# Patient Record
Sex: FEMALE | Race: WHITE
[De-identification: names, ages, dates, MRNs, and addresses within clinical notes are randomized per-mention and may not be internally consistent; named-entity substitution may affect disease eponyms.]

---

## 2017-10-27 NOTE — US
INDICATION:  Pelvic pain. 



PELVIC ULTRASOUND, NON-OB LIMITED:  Multiple ultrasonic images were obtained 
with transabdominal probe.  The uterus is anteverted.  The urinary bladder is 
almost completely empty.  The uterus measured 8.2 x 3.4 x 4.8 cm, with an 
endometrial cavity echo of approximately 3 mm.  The ovaries were not seen.  No 
definite adnexal mass lesion or free fluid collection was seen.  



IMPRESSION:  No gross abnormalities identified.  Need endovaginal probe 
ultrasound for better visualization of the uterus and for visualization of 
ovaries hopefully.   





INDICATION:  Pelvic pain/unable to visualize right ovary on transabdominal 
pelvic ultrasound.  



TRANSVAGINAL NON-OB ULTRASOUND:  Utilizing transvaginal probe, multiple 
ultrasonic images revealed the uterus to measure 7.5 x 3.1 x 4.8 cm with a 2.4-
mm endometrial cavity. 



The right ovary measured 2.6 x 2.8 x 1.5 cm.



The left ovary measured 2.4 x 1.5 x 2 cm.  



The endometrial cavity echo appeared normal.  



What appear to be prominent arcuate vessels are noted in the myometrium.  



The myometrium is slightly heterogeneous, however, no definite masses are 
identified.  



The ovaries had a normal appearance, with normal appearing follicles for the 
most part.  However, on the right the follicles are somewhat numerous with an 
appearance suggesting a partial string-of-pearls which can be seen with 
polycystic ovarian disease.  These ovaries are not significantly enlarged, 
however, to strongly suggest that possibility - correlate clinically.



No adnexal mass lesions or free fluid collections were identified.



Normal ovarian blood flow was seen.



IMPRESSION:  Essentially normal pelvic ultrasound.  Follicles are somewhat 
numerous and have an appearance of a chain or string-of-pearls at the right 
ovary.  This should be correlated clinically.  It likely is an incidental 
finding, as the ovaries were not significantly enlarged.  Polycystic ovarian 
disease felt to be unlikely - correlate clinically.  

MTDD

## 2017-10-27 NOTE — EDM.PDOC
ED HPI GENERAL MEDICAL PROBLEM





- General


Chief Complaint: Abdominal Pain


Stated Complaint: STOMACH PAIN


Time Seen by Provider: 10/27/17 07:00


Source of Information: Reports: Patient, Family


History Limitations: Reports: No Limitations





- History of Present Illness


INITIAL COMMENTS - FREE TEXT/NARRATIVE: 





22 years old w f came to the ed at 6.15 due to severe pain off and on for 4 

weeks, getting worse. Pt was seen by my [facility] at 6 .15 am.  Pt was signed out 

to me due to shift changes at 7 am, pending labs and abd. CT. Pt received 

Zofran here in the ed. Nausea has improved 80%, periumbilical abdominal pain is 

still present. 


Onset: Unknown/Unsure


Onset Date: 09/27/17


Onset Time: 06:00


Duration: Week(s):, Getting Worse


Location: Reports: Abdomen


Quality: Reports: Ache, Burning, Dull


Severity: Moderate


Improves with: Reports: Rest


Worsens with: Reports: Movement


  ** Epigastric


Pain Score (Numeric/FACES): 5





- Related Data


 Allergies











Allergy/AdvReac Type Severity Reaction Status Date / Time


 


No Known Allergies Allergy   Verified 10/27/17 06:24











Home Meds: 


 Home Meds





Pantoprazole Sodium [Protonix] 40 mg PO DAILY #10 tab 10/27/17 [Rx]











Past Medical History





- Past Health History


Medical/Surgical History: Denies Medical/Surgical History


Other Gastrointestinal History: abd pain


Other OB/BYN History: pt notices more pain than normal with menstration





Social & Family History





- Tobacco Use


Smoking Status *Q: Current Every Day Smoker


Years of Tobacco use: 3


Packs/Tins Daily: 0.5





- Caffeine Use


Caffeine Use: Reports: Soda





- Recreational Drug Use


Recreational Drug Use: No





ED ROS GENERAL





- Review of Systems


Review Of Systems: See Below


Constitutional: Reports: No Symptoms


HEENT: Reports: No Symptoms


Respiratory: Reports: No Symptoms


Cardiovascular: Reports: No Symptoms


Endocrine: Reports: No Symptoms


GI/Abdominal: Reports: Abdominal Pain


: Reports: No Symptoms


Musculoskeletal: Reports: No Symptoms


Skin: Reports: No Symptoms


Neurological: Reports: No Symptoms


Psychiatric: Reports: No Symptoms


Hematologic/Lymphatic: Reports: No Symptoms


Immunologic: Reports: No Symptoms





ED EXAM, GI/ABD





- Physical Exam


Exam: See Below


Exam Limited By: No Limitations


General Appearance: Alert, WD/WN, Mild Distress


Eyes: Bilateral: Normal Appearance


Ears: Normal External Exam, Normal Canal


Nose: Normal Inspection, Normal Mucosa


Throat/Mouth: Normal Inspection, Normal Lips


Head: Atraumatic, Normocephalic


Neck: Normal Inspection, Supple, Non-Tender, Full Range of Motion


Respiratory/Chest: No Respiratory Distress, Lungs Clear, Normal Breath Sounds, 

Chest Non-Tender


Cardiovascular: Normal Peripheral Pulses, Regular Rate, Rhythm, No Edema


GI/Abdominal Exam: Tender (periumbilical)


 (Female) Exam: Deferred


Rectal (Female) Exam: Deferred


Back Exam: Normal Inspection, Full Range of Motion


Extremities: Normal Inspection, Normal Range of Motion, Non-Tender, No Pedal 

Edema


Neurological: Alert, Oriented, CN II-XII Intact, Normal Cognition, Normal Gait, 

No Motor/Sensory Deficits


Psychiatric: Normal Affect, Normal Mood


Skin Exam: Warm, Dry, Intact


Lymphatic: No Adenopathy





Course





- Vital Signs


Text/Narrative:: 





22 years old w f came to the ed at 6.15 due to severe pain off and on for 4 

weeks, getting worse. Pt was seen by my [facility] at 6 .15 am.  Pt was signed out 

to me due to shift changes at 7 am, pending labs and abd. CT. Pt received 

Zofran here in the ed. Nausea has improved 80%, periumbilical abdominal pain is 

still present. 


PE: Perumbilical pain, worse when turning to the right side of her body. Tender 

epigastric area


Imaging: CT abd. NAD, Possible atelectasis at base of lung, official report is 

pending, US of pelvis: NAD 


Labs: CBC: WBC 3.6 HGB 13.6 Na 137 K 3.5 BUN 11 Cr 0.8 UA pos for ketons only


Impression: Gastritis


Tx: GI  Caocktail


Reexam: Pain subsided 100%


Plan: D/C with instructions.


Last Recorded V/S: 


 Last Vital Signs











Temp  36.8 C   10/27/17 09:59


 


Pulse  71   10/27/17 09:59


 


Resp  17   10/27/17 09:59


 


BP  127/76   10/27/17 09:59


 


Pulse Ox  100   10/27/17 09:59














- Orders/Labs/Meds


Orders: 


 Active Orders 24 hr











 Category Date Time Status


 


 Abdomen Pelvis w Cont [CT] Stat Exams  10/27/17 06:28 Taken


 


 Peripheral IV Insertion Adult [OM.PC] Routine Oth  10/27/17 06:26 Ordered











Labs: 


 Laboratory Tests











  10/27/17 10/27/17 10/27/17 Range/Units





  06:40 06:40 06:55 


 


WBC    3.6 L  (4.5-12.0)  X10-3/uL


 


RBC    3.97  (3.23-5.20)  x10(6)uL


 


Hgb    13.1  (11.5-15.5)  g/dL


 


Hct    38.3  (30.0-51.3)  %


 


MCV    96.3 H  (80-96)  fL


 


MCH    32.9  (27.7-33.6)  pg


 


MCHC    34.2  (32.2-35.4)  g/dL


 


RDW    11.3 L  (11.5-15.5)  %


 


Plt Count    198  (125-369)  X10(3)uL


 


MPV    7.8  (7.4-10.4)  fL


 


Neut % (Auto)    63.8  (46-82)  %


 


Lymph % (Auto)    27.1  (13-37)  %


 


Mono % (Auto)    5.6  (4-12)  %


 


Eos % (Auto)    3  (1.0-5.0)  %


 


Baso % (Auto)    1  (0-2)  %


 


Neut # (Auto)    2.3  (1.6-8.3)  #


 


Lymph # (Auto)    1.0  (0.6-5.0)  #


 


Mono # (Auto)    0.2  (0.0-1.3)  #


 


Eos # (Auto)    0.1  (0.0-0.8)  #


 


Baso # (Auto)    0.0  (0.0-0.2)  #


 


Sodium     (135-145)  mmol/L


 


Potassium     (3.5-5.3)  mmol/L


 


Chloride     (100-110)  mmol/L


 


Carbon Dioxide     (23-29)  mmol/L


 


BUN     (5-20)  mg/dL


 


Creatinine     (0.6-1.3)  mg/dL


 


Est Cr Clr Drug Dosing     mL/min


 


Estimated GFR (MDRD)     (>60)  


 


BUN/Creatinine Ratio     (9-20)  


 


Glucose     ()  mg/dL


 


Calcium     (8.6-10.2)  mg/dL


 


Total Bilirubin     (0.1-1.3)  mg/dL


 


AST     (5-27)  IU/L


 


ALT     (14-26)  IU/L


 


Alkaline Phosphatase     ()  IU/L


 


Total Protein     (6.0-8.0)  g/dL


 


Albumin     (3.5-5.2)  g/dL


 


Globulin     g/dL


 


Albumin/Globulin Ratio     


 


Amylase     ()  U/L


 


Urine Color   Yellow   (YELLOW)  


 


Urine Appearance   Slightly cloudy   (CLEAR)  


 


Urine pH   5.0   (5.0-6.5)  


 


Ur Specific Gravity   1.015   (1.010-1.025)  


 


Urine Protein   Negative   (NEGATIVE)  mg/dL


 


Urine Glucose (UA)   Normal   (NEGATIVE)  mg/dL


 


Urine Ketones   150 H   (NEGATIVE)  mg/dL


 


Urine Occult Blood   Negative   (NEGATIVE)  


 


Urine Nitrite   Negative   (NEGATIVE)  


 


Urine Bilirubin   Negative   (NEGATIVE)  


 


Urine Urobilinogen   Normal   (NEGATIVE)  mg/dL


 


Ur Leukocyte Esterase   Negative   (NEGATIVE)  


 


Urine RBC   0-5   (0)  


 


Urine WBC   0-5   (0)  


 


Ur Squamous Epith Cells   Moderate H   (NS,R,O)  


 


Urine Bacteria   Moderate H   (NS)  


 


Urine Mucus   Few H   (NS)  


 


Urine HCG, Qual  Negative    (NEGATIVE)  














  10/27/17 10/27/17 Range/Units





  06:55 06:55 


 


WBC    (4.5-12.0)  X10-3/uL


 


RBC    (3.23-5.20)  x10(6)uL


 


Hgb    (11.5-15.5)  g/dL


 


Hct    (30.0-51.3)  %


 


MCV    (80-96)  fL


 


MCH    (27.7-33.6)  pg


 


MCHC    (32.2-35.4)  g/dL


 


RDW    (11.5-15.5)  %


 


Plt Count    (125-369)  X10(3)uL


 


MPV    (7.4-10.4)  fL


 


Neut % (Auto)    (46-82)  %


 


Lymph % (Auto)    (13-37)  %


 


Mono % (Auto)    (4-12)  %


 


Eos % (Auto)    (1.0-5.0)  %


 


Baso % (Auto)    (0-2)  %


 


Neut # (Auto)    (1.6-8.3)  #


 


Lymph # (Auto)    (0.6-5.0)  #


 


Mono # (Auto)    (0.0-1.3)  #


 


Eos # (Auto)    (0.0-0.8)  #


 


Baso # (Auto)    (0.0-0.2)  #


 


Sodium  137   (135-145)  mmol/L


 


Potassium  3.5   (3.5-5.3)  mmol/L


 


Chloride  108  D   (100-110)  mmol/L


 


Carbon Dioxide  22 L   (23-29)  mmol/L


 


BUN  11   (5-20)  mg/dL


 


Creatinine  0.7   (0.6-1.3)  mg/dL


 


Est Cr Clr Drug Dosing  98.86   mL/min


 


Estimated GFR (MDRD)  > 60   (>60)  


 


BUN/Creatinine Ratio  15.7   (9-20)  


 


Glucose  103   ()  mg/dL


 


Calcium  8.5 L   (8.6-10.2)  mg/dL


 


Total Bilirubin  0.4   (0.1-1.3)  mg/dL


 


AST  21   (5-27)  IU/L


 


ALT  17  D   (14-26)  IU/L


 


Alkaline Phosphatase  59   ()  IU/L


 


Total Protein  6.8   (6.0-8.0)  g/dL


 


Albumin  4.1   (3.5-5.2)  g/dL


 


Globulin  2.7   g/dL


 


Albumin/Globulin Ratio  1.5   


 


Amylase   60  ()  U/L


 


Urine Color    (YELLOW)  


 


Urine Appearance    (CLEAR)  


 


Urine pH    (5.0-6.5)  


 


Ur Specific Gravity    (1.010-1.025)  


 


Urine Protein    (NEGATIVE)  mg/dL


 


Urine Glucose (UA)    (NEGATIVE)  mg/dL


 


Urine Ketones    (NEGATIVE)  mg/dL


 


Urine Occult Blood    (NEGATIVE)  


 


Urine Nitrite    (NEGATIVE)  


 


Urine Bilirubin    (NEGATIVE)  


 


Urine Urobilinogen    (NEGATIVE)  mg/dL


 


Ur Leukocyte Esterase    (NEGATIVE)  


 


Urine RBC    (0)  


 


Urine WBC    (0)  


 


Ur Squamous Epith Cells    (NS,R,O)  


 


Urine Bacteria    (NS)  


 


Urine Mucus    (NS)  


 


Urine HCG, Qual    (NEGATIVE)  











Meds: 


Medications














Discontinued Medications














Generic Name Dose Route Start Last Admin





  Trade Name Freq  PRN Reason Stop Dose Admin


 


Al Hydroxide/Mg Hydroxide 15  0 ml  10/27/17 09:16  10/27/17 09:23





  ml/ Lidocaine HCl 15 ml  PO  10/27/17 09:17  15 ml





  ONETIME ONE   Administration


 


Iopamidol  100 ml  10/27/17 07:15  10/27/17 07:24





  Isovue-370 (76%)  IV  10/27/17 07:16  75 ml





  .AS DIRECTED ONE   Administration


 


Ketorolac Tromethamine  30 mg  10/27/17 07:06  10/27/17 07:15





  Toradol  IVPUSH  10/27/17 07:07  30 mg





  ONETIME ONE   Administration


 


Ondansetron HCl  4 mg  10/27/17 07:07  10/27/17 07:12





  Zofran  IVPUSH  10/27/17 07:08  4 mg





  ONETIME ONE   Administration


 


Sodium Chloride  10 ml  10/27/17 06:27  10/27/17 07:13





  Saline Flush  FLUSH   10 ml





  ASDIRECTED PRN   Administration





  Keep Vein Open   














Departure





- Departure


Time of Disposition: 09:54


Disposition: Home, Self-Care 01


Condition: Good


Clinical Impression: 


 Gastritis








- Discharge Information


Prescriptions: 


Pantoprazole Sodium [Protonix] 40 mg PO DAILY #10 tab


Instructions:  Gastritis, Adult, Easy-to-Read


Referrals: 


PCP,None [Primary Care Provider] - 


Forms:  ED Department Discharge, ED Return to Work/School Form


Additional Instructions: 


Please take Maalox 30 cc every evening 3 hours after your last meal of the day 

for 5 days. please take Protonic in the morning for 10 days. Please f/u, come 

back if your symptoms get worse acutely.

## 2017-10-27 NOTE — ER
DATE SEEN:  10/27/2017

 

CHIEF COMPLAINT:  Abdominal pain.

 

HISTORY OF PRESENT ILLNESS:  This is a 22-year-old female, complaining of

abdominal pain for about 2 weeks, midepigastric pain with no radiation,

associated with vomiting.  Symptoms have not improved despite over-the-counter

medications.

 

PAST MEDICAL HISTORY:  She has anxiety, ADHD, and no surgeries.  Of note, she

was here 2 years ago with similar symptoms.  A CT at that time showed mesenteric

adenitis.

 

REVIEW OF SYSTEMS:  Denies any chest pain, fever or chills, or urinary symptoms.

Last period was about a week ago, normal.

 

PHYSICAL EXAMINATION:  GENERAL:  She is anxious, but not in distress.  VITAL

SIGNS:  Normal blood pressure and temperature.  ABDOMEN:  Nondistended.

Exquisitely tender in the epigastrium and around the umbilicus.  No rebound.

Bowel sounds are present.  No masses.

 

IMPRESSION:  Abdominal pain of uncertain etiology.

 

PLAN:  My plan is to obtain a CBC, CMP, amylase, urine pregnancy, and CT of the

abdomen and pelvis.  I will have my colleague coming in on 7th relay the results

and discuss disposition and treatment afterwards.

 

Time seen is 1825 hours.

 

Job#: [id number]

DD: 10/27/2017 0635

DT: 10/27/2017 0756 TN/BREE

## 2020-07-06 NOTE — CR
INDICATION:  Cough/5 months pregnancy. 



CHEST, ONE VIEW:  An AP upright view of the chest was obtained 07/06/20 and 
compared with 03/11/14.



The heart and mediastinum remain unremarkable.  



There is suggestion of a very minimal dextroconvex scoliosis of the mid thoracic
spine.



A definite active infiltrate or effusion was not identified.  



However, there is bronchial wall cuffing at the lower lung fields, which may be 
on the basis of active peribronchial disease and/or fibrosis, and should be 
correlated clinically.

MTDD

## 2020-07-06 NOTE — EDM.PDOC
ED HPI GENERAL MEDICAL PROBLEM





- General


Chief Complaint: Respiratory Problem


Stated Complaint: COVID SYMPTOMS


Time Seen by Provider: 20 17:00


Source of Information: Reports: Patient


History Limitations: Reports: No Limitations





- History of Present Illness


INITIAL COMMENTS - FREE TEXT/NARRATIVE: 





pt  at about 20 weeks with EDC on  comes in with c/o productive c

ough and SOB as well rib pain from coughing , denies fever chills or , report 

mild diarrhea as well chronic nausea with her pregnancy , denies any other 

associated sx or concerns.


the above sx started about 5 days ago, pt report being exposed to a positive 

covid pt 4 days prior to that.


pt indicate Hx of asthma and denies any other comorbidities.   


Treatments PTA: Reports: Other (see below)


Other Treatments PTA: inhaler





- Related Data


                                    Allergies











Allergy/AdvReac Type Severity Reaction Status Date / Time


 


No Known Allergies Allergy   Verified 18 03:34











Home Meds: 


                                    Home Meds





Calcium Carbonate [Tums] 200 mg PO ASDIRECTED PRN 18 [History]











Past Medical History





- Past Health History


Medical/Surgical History: Denies Medical/Surgical History


Respiratory History: Reports: Asthma


Gastrointestinal History: Reports: Gastritis


Other Gastrointestinal History: Abdominal pain.


Other OB/GYN History: Menstrual pain.


Psychiatric History: Reports: ADHD





Social & Family History





- Tobacco Use


Smoking Status *Q: Never Smoker





- Caffeine Use


Caffeine Use: Reports: None





- Recreational Drug Use


Recreational Drug Use: No





ED ROS GENERAL





- Review of Systems


Review Of Systems: See Below


Constitutional: Reports: Fatigue.  Denies: Fever, Chills


HEENT: Reports: No Symptoms


Respiratory: Reports: Shortness of Breath, Wheezing, Cough


Cardiovascular: Reports: No Symptoms


GI/Abdominal: Reports: Anorexia, Diarrhea, Nausea.  Denies: Vomiting


: Reports: No Symptoms


Musculoskeletal: Reports: No Symptoms


Skin: Reports: No Symptoms


Neurological: Reports: No Symptoms


Psychiatric: Reports: No Symptoms





ED EXAM, GENERAL





- Physical Exam


Exam: See Below


Exam Limited By: No Limitations


General Appearance: Alert, Mild Distress


Eye Exam: Bilateral Eye: Normal Inspection


Ears: Normal External Exam


Nose: Normal Inspection


Throat/Mouth: Normal Inspection, Normal Oropharynx


Head: Atraumatic, Normocephalic


Neck: Normal Inspection, Supple, Non-Tender


Respiratory/Chest: Rhonchi, Wheezing.  No: No Accessory Muscle Use, Stridor


Cardiovascular: Normal Peripheral Pulses, Regular Rate, Rhythm, No Edema, No 

Murmur


GI/Abdominal: Normal Bowel Sounds, Soft, Non-Tender, No Distention


Extremities: Normal Inspection, Normal Range of Motion, Non-Tender


Neurological: Alert, Oriented, CN II-XII Intact


Psychiatric: Normal Affect


Skin Exam: Warm





Course





- Vital Signs


Text/Narrative:: 





CXR/ lab results were explained to pt. Covid test is neg.


pt appear to have acute bronchitis with secondary ashma exacerbation.


she was given solumedrol here , will be placed on zpack and prednisone for 5 

days.


pt was asked to apply self quarantine for 14 days from the day of known exposure

 to covid which was .


pt to contact a healthcare facility for any worsening of current symptoms.  


Last Recorded V/S: 


                                Last Vital Signs











Temp  36.8 C   20 16:29


 


Pulse  120 H  20 17:00


 


Resp  18   20 16:29


 


BP  125/84   20 16:29


 


Pulse Ox  98   20 16:29














- Orders/Labs/Meds


Orders: 


                               Active Orders 24 hr











 Category Date Time Status


 


 RT Aerosol Therapy [RC] ASDIRECTED Care  20 17:00 Active











Labs: 


                                Laboratory Tests











  20 Range/Units





  16:43 17:15 17:15 


 


WBC   10.6   (4.5-12.0)  X10-3/uL


 


RBC   3.44   (3.23-5.20)  x10(6)uL


 


Hgb   11.6   (11.5-15.5)  g/dL


 


Hct   33.6   (30.0-51.3)  %


 


MCV   97.7 H   (80-96)  fL


 


MCH   33.7 H   (27.7-33.6)  pg


 


MCHC   34.5   (32.2-35.4)  g/dL


 


RDW   12.2   (11.5-15.5)  %


 


Plt Count   256   (125-369)  X10(3)uL


 


MPV   8.0   (7.4-10.4)  fL


 


Neut % (Auto)   76.7   (46-82)  %


 


Lymph % (Auto)   18.5   (13-37)  %


 


Mono % (Auto)   3.2 L   (4-12)  %


 


Eos % (Auto)   1   (1.0-5.0)  %


 


Baso % (Auto)   0   (0-2)  %


 


Neut # (Auto)   8.2   (1.6-8.3)  #


 


Lymph # (Auto)   2.0   (0.6-5.0)  #


 


Mono # (Auto)   0.3   (0.0-1.3)  #


 


Eos # (Auto)   0.1   (0.0-0.8)  #


 


Baso # (Auto)   0.0   (0.0-0.2)  #


 


Sodium    138  (135-145)  mmol/L


 


Potassium    3.4 L  (3.5-5.3)  mmol/L


 


Chloride    103  (100-110)  mmol/L


 


Carbon Dioxide    22  (21-32)  mmol/L


 


BUN    13  (7-18)  mg/dL


 


Creatinine    0.8  (0.55-1.02)  mg/dL


 


Est Cr Clr Drug Dosing    81.12  mL/min


 


Estimated GFR (MDRD)    > 60  (>60)  


 


BUN/Creatinine Ratio    16.3  (9-20)  


 


Glucose    115  ()  mg/dL


 


Calcium    9.0  (8.6-10.2)  mg/dL


 


Total Bilirubin    0.2  (0.1-1.3)  mg/dL


 


AST    15  (5-25)  IU/L


 


ALT    18  (12-36)  U/L


 


Alkaline Phosphatase    68  ()  IU/L


 


Total Protein    6.9  (6.0-8.0)  g/dL


 


Albumin    3.3 L  (3.5-5.2)  g/dL


 


Globulin    3.6  g/dL


 


Albumin/Globulin Ratio    0.9  


 


SARS Virus RNA (PCR)  Negative    (NEGATIVE)  











Meds: 


Medications














Discontinued Medications














Generic Name Dose Route Start Last Admin





  Trade Name Freq  PRN Reason Stop Dose Admin


 


Albuterol/Ipratropium  3 ml  20 17:00  20 17:00





  Duoneb 3.0-0.5 Mg/3 Ml  NEB  20 17:01  3 ml





  ONETIME ONE   Administration














Departure





- Departure


Time of Disposition: 18:21


Disposition: Home, Self-Care 01


Clinical Impression: 


 Acute bronchitis








- Discharge Information


Referrals: 


PCP,None [Primary Care Provider] - 


Forms:  ED Department Discharge





Sepsis Event Note (ED)





- Evaluation


Sepsis Screening Result: No Definite Risk





- Focused Exam


Vital Signs: 


                                   Vital Signs











  Temp Pulse Resp BP Pulse Ox


 


 20 17:00   120 H   


 


 20 16:29  36.8 C  120 H  18  125/84  98














- My Orders


Last 24 Hours: 


My Active Orders





20 17:00


RT Aerosol Therapy [RC] ASDIRECTED 














- Assessment/Plan


Last 24 Hours: 


My Active Orders





20 17:00


RT Aerosol Therapy [RC] ASDIRECTED

## 2020-11-14 ENCOUNTER — HOSPITAL ENCOUNTER (EMERGENCY)
Dept: HOSPITAL 7 - FB.ED | Age: 25
Discharge: HOME | End: 2020-11-14
Payer: MEDICAID

## 2020-11-14 DIAGNOSIS — O21.9: Primary | ICD-10-CM

## 2020-11-14 DIAGNOSIS — Z87.891: ICD-10-CM

## 2020-11-14 DIAGNOSIS — O99.513: ICD-10-CM

## 2020-11-14 DIAGNOSIS — Z3A.39: ICD-10-CM

## 2020-11-14 DIAGNOSIS — J45.909: ICD-10-CM

## 2020-11-14 PROCEDURE — 36415 COLL VENOUS BLD VENIPUNCTURE: CPT

## 2020-11-14 PROCEDURE — 99284 EMERGENCY DEPT VISIT MOD MDM: CPT

## 2020-11-14 PROCEDURE — 96374 THER/PROPH/DIAG INJ IV PUSH: CPT

## 2020-11-14 PROCEDURE — 85025 COMPLETE CBC W/AUTO DIFF WBC: CPT

## 2020-11-14 PROCEDURE — 80053 COMPREHEN METABOLIC PANEL: CPT

## 2020-11-14 NOTE — EDM.PDOC
ED HPI GENERAL MEDICAL PROBLEM





- General


Chief Complaint: Gastrointestinal Problem


Stated Complaint: DEHYDRATED


Time Seen by Provider: 20 19:46


Source of Information: Reports: Patient


History Limitations: Reports: No Limitations





- History of Present Illness


INITIAL COMMENTS - FREE TEXT/NARRATIVE: 


Angelica is 25 you primi at 39w6d,she is comes with intractable nausea and v

omiting since last night. No bleeding,no abdominal pain or contractions


  ** Lower abdomen


Pain Score (Numeric/FACES): 6





- Related Data


                                    Allergies











Allergy/AdvReac Type Severity Reaction Status Date / Time


 


No Known Allergies Allergy   Verified 20 19:27











Home Meds: 


                                    Home Meds





Budesonide [Pulmicort Flexhaler] 2 puff BID PRN 20 [History]


Ondansetron [Ondansetron ODT] 4 mg ASDIRECTED PRN 20 [History]


Prenatal Vit #76/Iron,Carb/Fa [Prenatabs Rx] 1 each PO DAILY 20 [History]


diphenhydrAMINE [Benadryl] 25 mg PO Q8H PRN 20 [History]


polyethylene glycoL 3350 [MiraLAX] 17 gm PO DAILY PRN 20 [History]











Past Medical History





- Past Health History


Medical/Surgical History: Denies Medical/Surgical History


Respiratory History: Reports: Asthma


Gastrointestinal History: Reports: Gastritis


Other Gastrointestinal History: Abdominal pain.


OB/GYN History: Reports: Pregnancy


Other OB/GYN History: 


Psychiatric History: Reports: ADHD





- Infectious Disease History


Infectious Disease History: Reports: None





Social & Family History





- Family History


Family Medical History: No Pertinent Family History





- Tobacco Use


Tobacco Use Status *Q: Former Tobacco User


Used Tobacco, but Quit: Yes


Month/Year Tobacco Last Used: 





- Caffeine Use


Caffeine Use: Reports: None





- Recreational Drug Use


Recreational Drug Use: No





ED ROS GENERAL





- Review of Systems


Review Of Systems: Comprehensive ROS is negative, except as noted in HPI.





ED EXAM PREGNANCY





- Physical Exam


Exam: See Below


Exam Limited By: No Limitations


General Appearance: Alert, WD/WN, No Apparent Distress


Ears: Normal External Exam


Cardiovascular: Normal Peripheral Pulses





Course





- Vital Signs


Last Recorded V/S: 


                                Last Vital Signs











Temp  97.9 F   20 19:30


 


Pulse  76   20 19:30


 


Resp  20   20 19:30


 


BP  129/71   20 19:30


 


Pulse Ox  100   20 19:30














- Orders/Labs/Meds


Orders: 


                               Active Orders 24 hr











 Category Date Time Status


 


 UA W/MICROSCOPIC [URIN] Stat Lab  20 19:43 Ordered


 


 Sodium Chloride 0.9% [Normal Saline] 1,000 ml Med  20 19:45 Active





 IV ASDIRECTED   


 


 Sodium Chloride 0.9% [Saline Flush] Med  20 19:43 Active





 10 ml FLUSH ASDIRECTED PRN   


 


 Peripheral IV Insertion Adult [OM.PC] Routine Oth  20 19:43 Ordered








                                Medication Orders





Sodium Chloride (Normal Saline)  1,000 mls @ 999 mls/hr IV ASDIRECTED ERIC


   Last Admin: 20 19:57  Dose: 999 mls/hr


   Documented by: LEX


Sodium Chloride (Saline Flush)  10 ml FLUSH ASDIRECTED PRN


   PRN Reason: Keep Vein Open


   Last Admin: 20 19:55  Dose: 10 ml


   Documented by: LEX








Labs: 


                                Laboratory Tests











  20 Range/Units





  19:57 19:57 


 


WBC  7.9   (3.0-10.3)  x10-3/uL


 


RBC  3.83   (3.60-5.20)  x10(6)uL


 


Hgb  12.6   (11.4-15.5)  g/dL


 


Hct  37.5   (34.2-48.2)  %


 


MCV  97.8   (76.7-100.5)  fL


 


MCH  32.8   (23.9-33.9)  pg


 


MCHC  33.5   (31.9-34.8)  g/dL


 


RDW  13.1   (12.3-16.5)  %


 


Plt Count  217   (151-488)  x10(3)uL


 


MPV  8.8   (7.1-12.4)  fL


 


Neut % (Auto)  74.4   (30.8-76.2)  %


 


Lymph % (Auto)  19.8   (18.4-52.1)  %


 


Mono % (Auto)  4.5   (4.4-15.7)  %


 


Eos % (Auto)  0.5 L   (0.6-8.1)  %


 


Baso % (Auto)  0.8   (0.2-1.5)  %


 


Neut # (Auto)  5.9   (1.5-6.3)  x10-3/uL


 


Lymph # (Auto)  1.6   (1.0-4.4)  x10-3/uL


 


Mono # (Auto)  0.4   (0.3-1.0)  x10-3/uL


 


Eos # (Auto)  0.0   (0.0-0.8)  x10-3/uL


 


Baso # (Auto)  0.1   (0.0-0.1)  x10-3/uL


 


Sodium   134 L  (135-145)  mmol/L


 


Potassium   3.9  (3.5-5.3)  mmol/L


 


Chloride   100  (100-110)  mmol/L


 


Carbon Dioxide   19 L  (21-32)  mmol/L


 


BUN   16  (7-18)  mg/dL


 


Creatinine   1.0  (0.55-1.02)  mg/dL


 


Est Cr Clr Drug Dosing   64.33  mL/min


 


Estimated GFR (MDRD)   > 60  (>60)  


 


BUN/Creatinine Ratio   16.0  (9-20)  


 


Glucose   72 L  ()  mg/dL


 


Calcium   9.1  (8.6-10.2)  mg/dL


 


Total Bilirubin   0.5  (0.1-1.3)  mg/dL


 


AST   22  D  (5-25)  IU/L


 


ALT   20  D  (12-36)  U/L


 


Alkaline Phosphatase   171 H  ()  IU/L


 


Total Protein   7.2  (6.0-8.0)  g/dL


 


Albumin   3.2 L  (3.5-5.2)  g/dL


 


Globulin   4.0  g/dL


 


Albumin/Globulin Ratio   0.8  











Meds: 


Medications











Generic Name Dose Route Start Last Admin





  Trade Name Freq  PRN Reason Stop Dose Admin


 


Sodium Chloride  1,000 mls @ 999 mls/hr  20 19:45  20 19:57





  Normal Saline  IV   999 mls/hr





  ASDIRECTED ERIC   Administration


 


Sodium Chloride  10 ml  20 19:43  20 19:55





  Saline Flush  FLUSH   10 ml





  ASDIRECTED PRN   Administration





  Keep Vein Open  














Discontinued Medications














Generic Name Dose Route Start Last Admin





  Trade Name Freq  PRN Reason Stop Dose Admin


 


Ondansetron HCl  8 mg  20 19:43  20 19:57





  Zofran  IVPUSH  20 19:44  8 mg





  ONETIME ONE   Administration














Departure





- Departure


Time of Disposition: 22:30


Disposition: Home, Self-Care 01


Condition: Good


Clinical Impression: 


 Nausea/vomiting in pregnancy








- Discharge Information


Instructions:  Nausea and Vomiting, Adult, Easy-to-Read


Referrals: 


Aspen Min NP [Primary Care Provider] - 


Forms:  ED Department Discharge


Additional Instructions: 


Activity as tolerated.


Increase fluids as tolerated.





Zofran as directed for nausea.





Follow up with your regular MD as needed.





Sepsis Event Note (ED)





- Evaluation


Sepsis Screening Result: No Definite Risk





- Focused Exam


Vital Signs: 


                                   Vital Signs











  Temp Pulse Resp BP Pulse Ox


 


 20 19:30  97.9 F  76  20  129/71  100














- Problem List & Annotations


(1) Nausea/vomiting in pregnancy


SNOMED Code(s): 27721042, 510914934


   Code(s): O21.9 - VOMITING OF PREGNANCY, UNSPECIFIED   Status: Acute   Current

Visit: Yes   





- Problem List Review


Problem List Initiated/Reviewed/Updated: Yes





- My Orders


Last 24 Hours: 


My Active Orders





20 19:43


UA W/MICROSCOPIC [URIN] Stat 


Sodium Chloride 0.9% [Saline Flush]   10 ml FLUSH ASDIRECTED PRN 


Peripheral IV Insertion Adult [OM.PC] Routine 





20 19:45


Sodium Chloride 0.9% [Normal Saline] 1,000 ml IV ASDIRECTED 














- Assessment/Plan


Last 24 Hours: 


My Active Orders





20 19:43


UA W/MICROSCOPIC [URIN] Stat 


Sodium Chloride 0.9% [Saline Flush]   10 ml FLUSH ASDIRECTED PRN 


Peripheral IV Insertion Adult [OM.PC] Routine 





20 19:45


Sodium Chloride 0.9% [Normal Saline] 1,000 ml IV ASDIRECTED 











Plan: 


1 L Normal saline and IV Zofran.

## 2020-11-15 VITALS — DIASTOLIC BLOOD PRESSURE: 62 MMHG | HEART RATE: 84 BPM | SYSTOLIC BLOOD PRESSURE: 113 MMHG

## 2021-10-10 ENCOUNTER — HOSPITAL ENCOUNTER (EMERGENCY)
Dept: HOSPITAL 7 - FB.ED | Age: 26
Discharge: HOME | End: 2021-10-10
Payer: COMMERCIAL

## 2021-10-10 VITALS — SYSTOLIC BLOOD PRESSURE: 129 MMHG | HEART RATE: 96 BPM | DIASTOLIC BLOOD PRESSURE: 82 MMHG

## 2021-10-10 DIAGNOSIS — J98.01: Primary | ICD-10-CM

## 2021-10-10 DIAGNOSIS — J01.90: ICD-10-CM

## 2021-10-10 DIAGNOSIS — H66.003: ICD-10-CM

## 2021-10-10 PROCEDURE — 99283 EMERGENCY DEPT VISIT LOW MDM: CPT

## 2021-10-10 NOTE — EDM.PDOC
ED HPI GENERAL MEDICAL PROBLEM





- General


Chief Complaint: ENT Problem


Stated Complaint: COUGH, FEVER, SOB


Time Seen by Provider: 10/10/21 16:15


Source of Information: Reports: Patient, Old Records, RN


History Limitations: Reports: No Limitations





- History of Present Illness


INITIAL COMMENTS - FREE TEXT/NARRATIVE: 





25 yo female with a pHx of asthma presents with a cough that is occasionally 

productive, nasal discharge(often yellow) and decreased hearing in both ears. No

fever. Was seen in the walkin clinic and given prednisone due to some wheezing 

she had and didn't improve on it. Has a nebulizer at home that gives her minimal

benefit. Is mildly SOB. Had negative Covid test during this illness. 


Onset: Gradual


Duration: Week(s):, Getting Worse


Location: Reports: Head, Face, Chest


Quality: Reports: Other (no pain)


Severity: Moderate


Improves with: Reports: None


Worsens with: Reports: Other (time)


Context: Reports: Other (See HPI)


Associated Symptoms: Reports: Cough, Shortness of Breath (mild), Other 

(rhinorrhea and decreased hearing R>L).  Denies: Fever/Chills


Treatments PTA: Reports: Other (see below) (prednisone)





- Related Data


                                    Allergies











Allergy/AdvReac Type Severity Reaction Status Date / Time


 


No Known Allergies Allergy   Verified 20 19:27











Home Meds: 


                                    Home Meds





Budesonide [Pulmicort Flexhaler] 2 puff BID PRN 20 [History]


Ondansetron [Ondansetron ODT] 4 mg ASDIRECTED PRN 20 [History]


Prenatal Vit #76/Iron,Carb/Fa [Prenatabs Rx] 1 each PO DAILY 20 [History]


diphenhydrAMINE [Benadryl] 25 mg PO Q8H PRN 20 [History]


polyethylene glycoL 3350 [MiraLAX] 17 gm PO DAILY PRN 20 [History]











Past Medical History





- Past Health History


Medical/Surgical History: Denies Medical/Surgical History


Respiratory History: Reports: Asthma


Gastrointestinal History: Reports: Gastritis


Other Gastrointestinal History: Abdominal pain.


OB/GYN History: Reports: Pregnancy


Other OB/GYN History: 


Psychiatric History: Reports: ADHD





- Infectious Disease History


Infectious Disease History: Reports: None





Social & Family History





- Family History


Family Medical History: No Pertinent Family History





- Caffeine Use


Caffeine Use: Reports: None





ED ROS ENT





- Review of Systems


Review Of Systems: See Below


Constitutional: Denies: Fever, Chills


HEENT: Reports: Rhinitis (often yellow).  Denies: Ear Discharge, Ear Pain 

(decreased hearing both ears, R ear is worse)


Respiratory: Reports: Cough, Sputum (yellow)


Cardiovascular: Reports: No Symptoms


GI/Abdominal: Reports: No Symptoms


Skin: Reports: No Symptoms


Neurological: Reports: No Symptoms


Psychiatric: Reports: No Symptoms





ED EXAM, ENT





- Physical Exam


Exam: See Below


Exam Limited By: No Limitations


General Appearance: Alert, WD/WN, No Apparent Distress


Eye Exam: Bilateral Eye: Normal Inspection


Ears: Normal External Exam, Normal Canal, TM Dullness (bilat), TM Erythema (left

), TM Fluid.  No: Hearing Grossly Normal, Normal TMs


Nose: Other (cloudy, slightly yellow tinged nasal discharge)


Mouth/Throat: Normal Inspection, Normal Lips, Normal Oropharynx


Head: Atraumatic, Normocephalic


Neck: Normal Inspection.  No: Lymphadenopathy (R), Lymphadenopathy (L)


Respiratory/Chest: No Accessory Muscle Use, Rhonchi, Wheezing, Other (frequent 

cough).  No: No Respiratory Distress, Lungs Clear, Normal Breath Sounds, 

Crackles, Rales


Cardiovascular: Regular Rate, Rhythm


Back: Normal Inspection


Extremities: Normal Inspection


Neurological: Alert, Oriented, CN II-XII Intact, Normal Cognition, No 

Motor/Sensory Deficits


Psychiatric: Normal Affect, Normal Mood


Skin: Warm, Dry, Intact, Normal Color, No Rash





Course





- Orders/Labs/Meds


Orders: 





                               Active Orders 24 hr











 Category Date Time Status


 


 Amoxicillin [Amoxil] Med  10/10/21 16:22 Once





 1,000 mg PO ONETIME ONE   














Departure





- Departure


Time of Disposition: 16:35


Disposition: Home, Self-Care 01


Condition: Fair


Clinical Impression: 


 Bronchospasm





Bilateral otitis media


Qualifiers:


 Otitis media type: suppurative Chronicity: acute Recurrence: non-recurrent 

Spontaneous tympanic membrane rupture: without spontaneous rupture Qualified 

Code(s): H66.003 - Acute suppurative otitis media without spontaneous rupture of

 ear drum, bilateral





Sinusitis


Qualifiers:


 Sinusitis location: unspecified location Chronicity: acute Recurrence: non-

recurrent Qualified Code(s): J01.90 - Acute sinusitis, unspecified








- Discharge Information


*PRESCRIPTION DRUG MONITORING PROGRAM REVIEWED*: Not Applicable


*COPY OF PRESCRIPTION DRUG MONITORING REPORT IN PATIENT LENOIDAS: Not Applicable


Instructions:  Otitis Media, Adult, Easy-to-Read, Sinusitis, Adult, 

Easy-to-Read, Bronchospasm, Adult, Easy-to-Read


Referrals: 


PCP,None [Primary Care Provider] - 


Forms:  ED Department Discharge


Additional Instructions: 


Take amoxicillin every 8 hrs until gone. Continue use of your nebulizer 

treatments. Consider use of Robitussin or Robitussin DM to loosen secretions and

reduce cough. Add acetaminophen as needed for pain or fever control. Drink ample

fluids and consider use a humidifier. Recheck in the clinic a week from 

tomorrow, sooner if worse. 





- My Orders


Last 24 Hours: 





My Active Orders





10/10/21 16:22


Amoxicillin [Amoxil]   1,000 mg PO ONETIME ONE 














- Assessment/Plan


Last 24 Hours: 





My Active Orders





10/10/21 16:22


Amoxicillin [Amoxil]   1,000 mg PO ONETIME ONE

## 2022-02-26 ENCOUNTER — HOSPITAL ENCOUNTER (EMERGENCY)
Dept: HOSPITAL 7 - FB.ED | Age: 27
Discharge: HOME | End: 2022-02-26
Payer: MEDICAID

## 2022-02-26 VITALS — SYSTOLIC BLOOD PRESSURE: 130 MMHG | HEART RATE: 89 BPM | DIASTOLIC BLOOD PRESSURE: 76 MMHG

## 2022-02-26 DIAGNOSIS — Z20.822: ICD-10-CM

## 2022-02-26 DIAGNOSIS — R11.2: ICD-10-CM

## 2022-02-26 DIAGNOSIS — A08.4: Primary | ICD-10-CM

## 2022-02-26 LAB — SARS-COV-2 RNA RESP QL NAA+PROBE: NEGATIVE

## 2022-02-26 PROCEDURE — C9113 INJ PANTOPRAZOLE SODIUM, VIA: HCPCS

## 2022-02-26 RX ADMIN — KETOROLAC TROMETHAMINE ONE MG: 30 INJECTION, SOLUTION INTRAMUSCULAR at 19:15

## 2022-02-26 RX ADMIN — ONDANSETRON ONE MG: 2 INJECTION, SOLUTION INTRAMUSCULAR; INTRAVENOUS at 18:07
